# Patient Record
Sex: MALE | Race: WHITE | NOT HISPANIC OR LATINO | Employment: OTHER | URBAN - METROPOLITAN AREA
[De-identification: names, ages, dates, MRNs, and addresses within clinical notes are randomized per-mention and may not be internally consistent; named-entity substitution may affect disease eponyms.]

---

## 2023-05-03 ENCOUNTER — EVALUATION (OUTPATIENT)
Dept: PHYSICAL THERAPY | Facility: CLINIC | Age: 68
End: 2023-05-03

## 2023-05-03 DIAGNOSIS — G89.29 CHRONIC MIDLINE LOW BACK PAIN WITH RIGHT-SIDED SCIATICA: Primary | ICD-10-CM

## 2023-05-03 DIAGNOSIS — M54.41 CHRONIC MIDLINE LOW BACK PAIN WITH RIGHT-SIDED SCIATICA: Primary | ICD-10-CM

## 2023-05-03 NOTE — PROGRESS NOTES
"PT Evaluation     Today's date: 5/3/2023  Patient name: Toya Cabral  : 1955  MRN: [id number]  Referring provider: David Olsen MD  Dx:   Encounter Diagnosis     ICD-10-CM    1  Chronic midline low back pain with right-sided sciatica  M54 41     G89 29           Start Time: 1040  Stop Time: 1116  Total time in clinic (min): 36 minutes    Assessment  Assessment details: Patient is a 70year old male who presents to OP PT with chief complaints of chronic low back pain that radiates to R thigh that has been going on for 2-3 years  Signs and symptoms may be consistent with disc herniation and sciatic nerve irritation  Deficits include limited lumbar ROM, RLE weakness, activity intolerance, neural tension of sciatic nerve, and diminished sensation in R L3 dermatome  Functionally, they have difficulty with lifting objects from the floor, standing, walking and negotiating stairs  Prone press ups and standing lumbar extension appear to be effective with patient reporting decrease in pain  PPU resulted in complete resolution on pain  Traction SLR performed and resulted in significant improvement in pain, lumbar flexion ROM, and walking ability  Following tSLR patient states that he is \"ready to run a race\"  Patient was given PPU as HEP  Patient would benefit from skilled PT services in order to address the stated deficits and return to PLOF  Impairments: abnormal or restricted ROM, activity intolerance, impaired physical strength, lacks appropriate home exercise program and pain with function  Understanding of Dx/Px/POC: good   Prognosis: good    Goals  ST  Patient will be independent with HEP in 4 weeks  2  Patient will decrease pain by 2 points in 4 weeks  LT  Patient will be able to squat to lift an object off of the floor without limitation in 8 weeks  2  Patient will be able to stand for >30 minutes without limitation in 8 weeks  3   Patient will be able to negotiate stairs without limitation " "in 8 weeks      Plan  Patient would benefit from: skilled physical therapy  Planned modality interventions: thermotherapy: hydrocollator packs  Planned therapy interventions: therapeutic exercise, therapeutic activities, strengthening, patient education, neuromuscular re-education, abdominal trunk stabilization, manual therapy, joint mobilization, home exercise program, graded exercise, graded activity, functional ROM exercises, activity modification and motor coordination training  Frequency: 2x week  Duration in visits: 16  Duration in weeks: 8  Plan of Care beginning date: 5/3/2023  Plan of Care expiration date: 2023  Treatment plan discussed with: patient        Subjective Evaluation    History of Present Illness  Mechanism of injury: Patient reports to OP PT with chief complaints of chronic low back pain about 2-3 years in duration as he slipped  Patient states that he was away and he saw a \"professional\" that fixed his back and was given \"russian water\" cream that helps decrease pain  Leaning forward relieves the pain and bending backwards increases pain  Occasionally pain radiates down R leg    Pain  Current pain rating: 3  At best pain rating: 3  At worst pain ratin  Location: back  Relieving factors: medications  Aggravating factors: walking, standing and stair climbing    Social Support  Stairs in house: yes   12  Lives in: multiple-level home  Lives with: spouse and adult children      Diagnostic Tests  X-ray: normal (degenerative changes)  Treatments  No previous or current treatments  Patient Goals  Patient goals for therapy: decreased pain  Patient goal: walk again        Objective     Neurological Testing     Sensation     Lumbar   Left   Diminished: light touch    Right   Intact: light touch    Comments   Left light touch: L3 dermatome    Active Range of Motion     Lumbar   Flexion:  with pain Restriction level: maximal  Extension:  Restriction level: minimal  Mechanical " Assessment    Cervical      Thoracic      Lumbar    Standing extension: repeated movements  Pain intensity: better  Pain level: decreased  Lying extension: repeated movements  Pain intensity: better  Pain level: abolished    Strength/Myotome Testing     Left Hip   Planes of Motion   Flexion: 4    Right Hip   Planes of Motion   Flexion: 3+    Left Knee   Flexion: 4+  Extension: 5    Right Knee   Flexion: 4-  Extension: 3+    Left Ankle/Foot   Dorsiflexion: 5    Right Ankle/Foot   Dorsiflexion: 4+    Tests     Lumbar     Right   Positive passive SLR  Negative slump test               Precautions: n/a        POC expires Auth Status Unit limit Start date  Expiration date PT/OT + Visit Limit?    6/28 pend pend pend pend pend   RE due 6/3                                      Foto 5/3            Manuals 5/3            L/s PA mobs             tSLR GS            PPU w overpress                          Neuro Re-Ed             Pallof press             TA contraction             TA+march             TA+SLR             Bird dog                                       Ther Ex             PPU HEP            Bridges             Prone hip ext             LTR             Open book                          Pt edu GS            Bike             Ther Activity             STS                          Gait Training                                       Modalities

## 2023-05-03 NOTE — LETTER
May 3, 2023    Nichelle Steel Richie [facility]  [address]  [address] [facility] [address]    Patient: Duglas Vizcarra   YOB: 1955   Date of Visit: 5/3/2023     Encounter Diagnosis     ICD-10-CM    1  Chronic midline low back pain with right-sided sciatica  M54 41     G89 29           Dear Dr Gilliland Holes:    Thank you for your recent referral of Duglas Vizcarra  Please review the attached evaluation summary from [facility]'s recent visit  Please verify that you agree with the plan of care by signing the attached order  If you have any questions or concerns, please do not hesitate to call  I sincerely appreciate the opportunity to share in the care of one of your patients and hope to have another opportunity to work with you in the near future  Sincerely,    Eladio Abbott, PT      Referring Provider:      I certify that I have read the below Plan of Care and certify the need for these services furnished under this plan of treatment while under my care  Nichellejuanita SteelFrancois Harpal  [address] [place] [id number]  Via Fax: [phone]          PT Evaluation     Today's date: 5/3/2023  Patient name: Duglas Vizcarra  : 1955  MRN: [id number]  Referring provider: Leon Luis MD  Dx:   Encounter Diagnosis     ICD-10-CM    1  Chronic midline low back pain with right-sided sciatica  M54 41     G89 29           Start Time: 1040  Stop Time: 1116  Total time in clinic (min): 36 minutes    Assessment  Assessment details: Patient is a 70year old male who presents to OP PT with chief complaints of chronic low back pain that radiates to R thigh that has been going on for 2-3 years  Signs and symptoms may be consistent with disc herniation and sciatic nerve irritation  Deficits include limited lumbar ROM, RLE weakness, activity intolerance, neural tension of sciatic nerve, and diminished sensation in R L3 dermatome    Functionally, they have difficulty with lifting objects from the "floor, standing, walking and negotiating stairs  Prone press ups and standing lumbar extension appear to be effective with patient reporting decrease in pain  PPU resulted in complete resolution on pain  Traction SLR performed and resulted in significant improvement in pain, lumbar flexion ROM, and walking ability  Following tSLR patient states that he is \"ready to run a race\"  Patient was given PPU as HEP  Patient would benefit from skilled PT services in order to address the stated deficits and return to PLOF  Impairments: abnormal or restricted ROM, activity intolerance, impaired physical strength, lacks appropriate home exercise program and pain with function  Understanding of Dx/Px/POC: good   Prognosis: good    Goals  ST  Patient will be independent with HEP in 4 weeks  2  Patient will decrease pain by 2 points in 4 weeks  LT  Patient will be able to squat to lift an object off of the floor without limitation in 8 weeks  2  Patient will be able to stand for >30 minutes without limitation in 8 weeks  3   Patient will be able to negotiate stairs without limitation in 8 weeks      Plan  Patient would benefit from: skilled physical therapy  Planned modality interventions: thermotherapy: hydrocollator packs  Planned therapy interventions: therapeutic exercise, therapeutic activities, strengthening, patient education, neuromuscular re-education, abdominal trunk stabilization, manual therapy, joint mobilization, home exercise program, graded exercise, graded activity, functional ROM exercises, activity modification and motor coordination training  Frequency: 2x week  Duration in visits: 16  Duration in weeks: 8  Plan of Care beginning date: 5/3/2023  Plan of Care expiration date: 2023  Treatment plan discussed with: patient        Subjective Evaluation    History of Present Illness  Mechanism of injury: Patient reports to OP PT with chief complaints of chronic low back pain about 2-3 years in " "duration as he slipped  Patient states that he was away and he saw a \"professional\" that fixed his back and was given \"russian water\" cream that helps decrease pain  Leaning forward relieves the pain and bending backwards increases pain  Occasionally pain radiates down R leg  Pain  Current pain rating: 3  At best pain rating: 3  At worst pain ratin  Location: back  Relieving factors: medications  Aggravating factors: walking, standing and stair climbing    Social Support  Stairs in house: yes   12  Lives in: multiple-level home  Lives with: spouse and adult children      Diagnostic Tests  X-ray: normal (degenerative changes)  Treatments  No previous or current treatments  Patient Goals  Patient goals for therapy: decreased pain  Patient goal: walk again        Objective     Neurological Testing     Sensation     Lumbar   Left   Diminished: light touch    Right   Intact: light touch    Comments   Left light touch: L3 dermatome    Active Range of Motion     Lumbar   Flexion:  with pain Restriction level: maximal  Extension:  Restriction level: minimal  Mechanical Assessment    Cervical      Thoracic      Lumbar    Standing extension: repeated movements  Pain intensity: better  Pain level: decreased  Lying extension: repeated movements  Pain intensity: better  Pain level: abolished    Strength/Myotome Testing     Left Hip   Planes of Motion   Flexion: 4    Right Hip   Planes of Motion   Flexion: 3+    Left Knee   Flexion: 4+  Extension: 5    Right Knee   Flexion: 4-  Extension: 3+    Left Ankle/Foot   Dorsiflexion: 5    Right Ankle/Foot   Dorsiflexion: 4+    Tests     Lumbar     Right   Positive passive SLR  Negative slump test              Precautions: n/a        POC expires Auth Status Unit limit Start date  Expiration date PT/OT + Visit Limit?     pend pend pend pend pend   RE due 6/3                                      Foto 5/3            Manuals 5/3            L/s RUDDY Wynne GS          " PPU w overpress                          Neuro Re-Ed             Pallof press             TA contraction             TA+march             TA+SLR             Bird dog                                       Ther Ex             PPU HEP            Bridges             Prone hip ext             LTR             Open book                          Pt edu GS            Bike             Ther Activity             STS                          Gait Training                                       Modalities 06-Jun-2019

## 2023-05-11 ENCOUNTER — APPOINTMENT (OUTPATIENT)
Dept: PHYSICAL THERAPY | Facility: CLINIC | Age: 68
End: 2023-05-11
Payer: COMMERCIAL

## 2023-05-15 ENCOUNTER — APPOINTMENT (OUTPATIENT)
Dept: PHYSICAL THERAPY | Facility: CLINIC | Age: 68
End: 2023-05-15
Payer: COMMERCIAL

## 2023-05-18 ENCOUNTER — APPOINTMENT (OUTPATIENT)
Dept: PHYSICAL THERAPY | Facility: CLINIC | Age: 68
End: 2023-05-18
Payer: COMMERCIAL

## 2023-05-23 ENCOUNTER — APPOINTMENT (OUTPATIENT)
Dept: PHYSICAL THERAPY | Facility: CLINIC | Age: 68
End: 2023-05-23
Payer: COMMERCIAL

## 2023-05-25 ENCOUNTER — APPOINTMENT (OUTPATIENT)
Dept: PHYSICAL THERAPY | Facility: CLINIC | Age: 68
End: 2023-05-25
Payer: COMMERCIAL

## 2023-05-30 ENCOUNTER — APPOINTMENT (OUTPATIENT)
Dept: PHYSICAL THERAPY | Facility: CLINIC | Age: 68
End: 2023-05-30
Payer: COMMERCIAL

## 2023-07-20 ENCOUNTER — TELEPHONE (OUTPATIENT)
Dept: DERMATOLOGY | Facility: CLINIC | Age: 68
End: 2023-07-20

## 2023-07-20 NOTE — TELEPHONE ENCOUNTER
Called and left message for patient regarding closure of [place] office today but are relocating patients/appointments to [facility] location. Requested return call.

## 2025-05-08 ENCOUNTER — TELEPHONE (OUTPATIENT)
Age: 70
End: 2025-05-08

## 2025-05-08 NOTE — TELEPHONE ENCOUNTER
Rec'd call from patient requesting to schedule as NP. No referral but per patient, his PCP recommended he see derm. Patient prefers [facility]. Offered next available with an AP. Patient declined. Offered soonest available including all locations. Patient declined scheduling.